# Patient Record
Sex: MALE | URBAN - NONMETROPOLITAN AREA
[De-identification: names, ages, dates, MRNs, and addresses within clinical notes are randomized per-mention and may not be internally consistent; named-entity substitution may affect disease eponyms.]

---

## 2019-03-13 ENCOUNTER — TRANSCRIBE ORDERS (OUTPATIENT)
Dept: ADMINISTRATIVE | Facility: HOSPITAL | Age: 64
End: 2019-03-13

## 2019-03-13 DIAGNOSIS — E11.65 TYPE II DIABETES MELLITUS WITH HYPEROSMOLARITY, UNCONTROLLED (HCC): Primary | ICD-10-CM

## 2019-03-13 DIAGNOSIS — E11.00 TYPE II DIABETES MELLITUS WITH HYPEROSMOLARITY, UNCONTROLLED (HCC): Primary | ICD-10-CM

## 2021-04-13 ENCOUNTER — HOSPITAL ENCOUNTER (EMERGENCY)
Dept: HOSPITAL 79 - ER1 | Age: 66
Discharge: HOME | End: 2021-04-13
Payer: COMMERCIAL

## 2021-04-13 DIAGNOSIS — M25.561: Primary | ICD-10-CM

## 2021-04-13 DIAGNOSIS — F17.210: ICD-10-CM

## 2021-04-13 DIAGNOSIS — I10: ICD-10-CM

## 2021-04-13 DIAGNOSIS — E11.9: ICD-10-CM

## 2021-04-13 DIAGNOSIS — I25.10: ICD-10-CM

## 2021-09-15 ENCOUNTER — HOSPITAL ENCOUNTER (INPATIENT)
Dept: HOSPITAL 79 - ER1 | Age: 66
LOS: 3 days | Discharge: HOME | DRG: 250 | End: 2021-09-18
Attending: INTERNAL MEDICINE | Admitting: INTERNAL MEDICINE
Payer: COMMERCIAL

## 2021-09-15 VITALS — WEIGHT: 239 LBS | BODY MASS INDEX: 37.51 KG/M2 | HEIGHT: 67 IN

## 2021-09-15 DIAGNOSIS — Z79.4: ICD-10-CM

## 2021-09-15 DIAGNOSIS — Z95.5: ICD-10-CM

## 2021-09-15 DIAGNOSIS — I21.4: ICD-10-CM

## 2021-09-15 DIAGNOSIS — I11.0: ICD-10-CM

## 2021-09-15 DIAGNOSIS — I34.0: ICD-10-CM

## 2021-09-15 DIAGNOSIS — E66.9: ICD-10-CM

## 2021-09-15 DIAGNOSIS — Z79.82: ICD-10-CM

## 2021-09-15 DIAGNOSIS — T82.867A: Primary | ICD-10-CM

## 2021-09-15 DIAGNOSIS — I25.2: ICD-10-CM

## 2021-09-15 DIAGNOSIS — I50.9: ICD-10-CM

## 2021-09-15 DIAGNOSIS — I45.10: ICD-10-CM

## 2021-09-15 DIAGNOSIS — Z20.822: ICD-10-CM
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
## 2021-09-15 DIAGNOSIS — I25.10: ICD-10-CM

## 2021-09-15 DIAGNOSIS — E78.5: ICD-10-CM

## 2021-09-15 DIAGNOSIS — E11.9: ICD-10-CM

## 2021-09-15 DIAGNOSIS — I87.8: ICD-10-CM

## 2021-09-15 DIAGNOSIS — F17.210: ICD-10-CM

## 2021-09-15 LAB
BUN/CREATININE RATIO: 19 (ref 0–10)
HGB BLD-MCNC: 15.9 GM/DL (ref 14–17.5)
RED BLOOD COUNT: 4.91 M/UL (ref 4.2–5.5)
WHITE BLOOD COUNT: 8.7 K/UL (ref 4.5–11)

## 2021-09-15 PROCEDURE — C1887 CATHETER, GUIDING: HCPCS

## 2021-09-15 PROCEDURE — 02703ZZ DILATION OF CORONARY ARTERY, ONE ARTERY, PERCUTANEOUS APPROACH: ICD-10-PCS | Performed by: STUDENT IN AN ORGANIZED HEALTH CARE EDUCATION/TRAINING PROGRAM

## 2021-09-15 PROCEDURE — U0002 COVID-19 LAB TEST NON-CDC: HCPCS

## 2021-09-15 PROCEDURE — C1894 INTRO/SHEATH, NON-LASER: HCPCS

## 2021-09-15 PROCEDURE — C1725 CATH, TRANSLUMIN NON-LASER: HCPCS

## 2021-09-15 PROCEDURE — B24BZZ4 ULTRASONOGRAPHY OF HEART WITH AORTA, TRANSESOPHAGEAL: ICD-10-PCS | Performed by: STUDENT IN AN ORGANIZED HEALTH CARE EDUCATION/TRAINING PROGRAM

## 2021-09-15 PROCEDURE — 4A023N7 MEASUREMENT OF CARDIAC SAMPLING AND PRESSURE, LEFT HEART, PERCUTANEOUS APPROACH: ICD-10-PCS | Performed by: STUDENT IN AN ORGANIZED HEALTH CARE EDUCATION/TRAINING PROGRAM

## 2021-09-15 PROCEDURE — C1769 GUIDE WIRE: HCPCS

## 2021-09-16 LAB
BUN/CREATININE RATIO: 15 (ref 0–10)
BUN/CREATININE RATIO: 17 (ref 0–10)
HGB BLD-MCNC: 15.1 GM/DL (ref 14–17.5)
RED BLOOD COUNT: 4.53 M/UL (ref 4.2–5.5)
WHITE BLOOD COUNT: 9.2 K/UL (ref 4.5–11)

## 2021-09-17 LAB
BUN/CREATININE RATIO: 16 (ref 0–10)
HGB BLD-MCNC: 14.1 GM/DL (ref 14–17.5)
RED BLOOD COUNT: 4.25 M/UL (ref 4.2–5.5)
WHITE BLOOD COUNT: 9.5 K/UL (ref 4.5–11)

## 2021-09-18 LAB — BUN/CREATININE RATIO: 18 (ref 0–10)

## 2021-09-20 ENCOUNTER — HOSPITAL ENCOUNTER (INPATIENT)
Dept: HOSPITAL 79 - ER1 | Age: 66
LOS: 3 days | Discharge: HOME | DRG: 281 | End: 2021-09-23
Attending: INTERNAL MEDICINE | Admitting: INTERNAL MEDICINE
Payer: COMMERCIAL

## 2021-09-20 VITALS — HEIGHT: 67 IN | BODY MASS INDEX: 32.8 KG/M2 | WEIGHT: 209 LBS

## 2021-09-20 DIAGNOSIS — I25.10: Primary | ICD-10-CM

## 2021-09-20 DIAGNOSIS — E11.22: ICD-10-CM

## 2021-09-20 DIAGNOSIS — E66.9: ICD-10-CM

## 2021-09-20 DIAGNOSIS — I50.9: ICD-10-CM

## 2021-09-20 DIAGNOSIS — Z20.822: ICD-10-CM

## 2021-09-20 DIAGNOSIS — I35.0: ICD-10-CM

## 2021-09-20 DIAGNOSIS — E78.5: ICD-10-CM

## 2021-09-20 DIAGNOSIS — N18.30: ICD-10-CM

## 2021-09-20 DIAGNOSIS — I13.0: ICD-10-CM

## 2021-09-20 DIAGNOSIS — Z96.659: ICD-10-CM

## 2021-09-20 DIAGNOSIS — I48.92: ICD-10-CM

## 2021-09-20 DIAGNOSIS — F17.200: ICD-10-CM

## 2021-09-20 DIAGNOSIS — I21.4: ICD-10-CM

## 2021-09-20 DIAGNOSIS — Z79.82: ICD-10-CM

## 2021-09-20 DIAGNOSIS — Z95.1: ICD-10-CM

## 2021-09-20 DIAGNOSIS — Z79.52: ICD-10-CM

## 2021-09-20 DIAGNOSIS — Z79.899: ICD-10-CM

## 2021-09-20 DIAGNOSIS — I48.0: ICD-10-CM

## 2021-09-20 LAB
BUN/CREATININE RATIO: 16 (ref 0–10)
HGB BLD-MCNC: 14.5 GM/DL (ref 14–17.5)
RED BLOOD COUNT: 4.29 M/UL (ref 4.2–5.5)
WHITE BLOOD COUNT: 8 K/UL (ref 4.5–11)

## 2021-09-20 PROCEDURE — G0008 ADMIN INFLUENZA VIRUS VAC: HCPCS

## 2021-09-20 PROCEDURE — U0002 COVID-19 LAB TEST NON-CDC: HCPCS

## 2021-09-20 PROCEDURE — G0378 HOSPITAL OBSERVATION PER HR: HCPCS

## 2021-09-21 LAB — BUN/CREATININE RATIO: 16 (ref 0–10)

## 2021-09-22 LAB
BUN/CREATININE RATIO: 16 (ref 0–10)
HGB BLD-MCNC: 12.8 GM/DL (ref 14–17.5)
RED BLOOD COUNT: 3.84 M/UL (ref 4.2–5.5)
WHITE BLOOD COUNT: 8.9 K/UL (ref 4.5–11)

## 2021-09-22 PROCEDURE — B24BZZ4 ULTRASONOGRAPHY OF HEART WITH AORTA, TRANSESOPHAGEAL: ICD-10-PCS | Performed by: STUDENT IN AN ORGANIZED HEALTH CARE EDUCATION/TRAINING PROGRAM

## 2021-09-22 PROCEDURE — 5A2204Z RESTORATION OF CARDIAC RHYTHM, SINGLE: ICD-10-PCS | Performed by: STUDENT IN AN ORGANIZED HEALTH CARE EDUCATION/TRAINING PROGRAM

## 2021-09-22 NOTE — NUR
[address] PARSONS PT AFIB WITH -140'S, PT BOUNCING AROUND FROM
120'-150'S. ORDER GIVEN LOPRESSOR 5MG Q 5MINS X 3DOSES. HAD TO GET IV ASSESS
DUE TO PT ON HEPARIN DRIP. 20 WAS PLACED IN RIGHT HAND BY KATIUSKA.
ALL 3 DOSES WERE
ADMINISTERED. PT HR STILL IN 'S-140'S. [address] PARSONS ORDERS
FOR STAT EKG, AM LABS , MAG LEVEL, START AMIODORONE DRIP 150 BOLUS, TRANSFER
TO PCU. CALLED GAVE REPORT TO [facility] ICU 2112, PT WILL BE TRANSFERRED TO PCU
6110 WHEN IT BECOMES CLEANED, TRANSFER VITALS AT 0323 147/55, R-19,98.6, P-132
PT IS A&O, PT WAS TRANSFERRED OFF FLOOR AT 0325
 
 
.

## 2021-09-23 LAB — BUN/CREATININE RATIO: 14 (ref 0–10)

## 2021-11-10 ENCOUNTER — HOSPITAL ENCOUNTER (OUTPATIENT)
Dept: HOSPITAL 79 - HEART 5 | Age: 66
End: 2021-11-10
Attending: PHYSICIAN ASSISTANT
Payer: COMMERCIAL

## 2021-11-10 DIAGNOSIS — I20.9: Primary | ICD-10-CM

## 2021-11-10 DIAGNOSIS — I48.91: ICD-10-CM

## 2021-11-10 DIAGNOSIS — R00.2: ICD-10-CM

## 2021-11-13 ENCOUNTER — HOSPITAL ENCOUNTER (INPATIENT)
Dept: HOSPITAL 79 - ER1 | Age: 66
LOS: 5 days | Discharge: HOME | DRG: 280 | End: 2021-11-18
Attending: INTERNAL MEDICINE | Admitting: INTERNAL MEDICINE
Payer: COMMERCIAL

## 2021-11-13 VITALS — WEIGHT: 228.5 LBS | BODY MASS INDEX: 35.87 KG/M2 | HEIGHT: 67.01 IN

## 2021-11-13 DIAGNOSIS — Z79.899: ICD-10-CM

## 2021-11-13 DIAGNOSIS — Z96.659: ICD-10-CM

## 2021-11-13 DIAGNOSIS — Z88.0: ICD-10-CM

## 2021-11-13 DIAGNOSIS — Z79.82: ICD-10-CM

## 2021-11-13 DIAGNOSIS — N40.0: ICD-10-CM

## 2021-11-13 DIAGNOSIS — Z83.3: ICD-10-CM

## 2021-11-13 DIAGNOSIS — I47.1: ICD-10-CM

## 2021-11-13 DIAGNOSIS — Z20.822: ICD-10-CM

## 2021-11-13 DIAGNOSIS — I50.43: ICD-10-CM

## 2021-11-13 DIAGNOSIS — R00.1: ICD-10-CM

## 2021-11-13 DIAGNOSIS — N18.30: ICD-10-CM

## 2021-11-13 DIAGNOSIS — T45.515A: ICD-10-CM

## 2021-11-13 DIAGNOSIS — F17.210: ICD-10-CM

## 2021-11-13 DIAGNOSIS — I13.0: Primary | ICD-10-CM

## 2021-11-13 DIAGNOSIS — I25.10: ICD-10-CM

## 2021-11-13 DIAGNOSIS — Z95.1: ICD-10-CM

## 2021-11-13 DIAGNOSIS — E78.5: ICD-10-CM

## 2021-11-13 DIAGNOSIS — E11.22: ICD-10-CM

## 2021-11-13 DIAGNOSIS — I21.A1: ICD-10-CM

## 2021-11-13 DIAGNOSIS — Z88.5: ICD-10-CM

## 2021-11-13 DIAGNOSIS — Z82.49: ICD-10-CM

## 2021-11-13 LAB
BUN/CREATININE RATIO: 16 (ref 0–10)
HGB BLD-MCNC: 12.5 GM/DL (ref 14–17.5)
RED BLOOD COUNT: 3.68 M/UL (ref 4.2–5.5)
WHITE BLOOD COUNT: 8.8 K/UL (ref 4.5–11)

## 2021-11-13 PROCEDURE — P9047 ALBUMIN (HUMAN), 25%, 50ML: HCPCS

## 2021-11-13 PROCEDURE — U0002 COVID-19 LAB TEST NON-CDC: HCPCS

## 2021-11-14 LAB
BUN/CREATININE RATIO: 12 (ref 0–10)
BUN/CREATININE RATIO: 13 (ref 0–10)
HGB BLD-MCNC: 11.6 GM/DL (ref 14–17.5)
RED BLOOD COUNT: 3.42 M/UL (ref 4.2–5.5)
WHITE BLOOD COUNT: 7.6 K/UL (ref 4.5–11)

## 2021-11-15 LAB
BUN/CREATININE RATIO: 15 (ref 0–10)
HGB BLD-MCNC: 11.5 GM/DL (ref 14–17.5)
RED BLOOD COUNT: 3.46 M/UL (ref 4.2–5.5)
WHITE BLOOD COUNT: 6.5 K/UL (ref 4.5–11)

## 2021-11-16 LAB — BUN/CREATININE RATIO: 16 (ref 0–10)

## 2021-11-17 LAB
BUN/CREATININE RATIO: 14 (ref 0–10)
HGB BLD-MCNC: 12.4 GM/DL (ref 14–17.5)
RED BLOOD COUNT: 3.69 M/UL (ref 4.2–5.5)
WHITE BLOOD COUNT: 6.4 K/UL (ref 4.5–11)

## 2021-11-18 LAB — BUN/CREATININE RATIO: 15 (ref 0–10)
